# Patient Record
Sex: FEMALE | Race: BLACK OR AFRICAN AMERICAN | NOT HISPANIC OR LATINO | URBAN - METROPOLITAN AREA
[De-identification: names, ages, dates, MRNs, and addresses within clinical notes are randomized per-mention and may not be internally consistent; named-entity substitution may affect disease eponyms.]

---

## 2017-04-11 ENCOUNTER — OUTPATIENT (OUTPATIENT)
Dept: OUTPATIENT SERVICES | Facility: HOSPITAL | Age: 37
LOS: 1 days | Discharge: HOME | End: 2017-04-11

## 2017-06-27 DIAGNOSIS — R76.11 NONSPECIFIC REACTION TO TUBERCULIN SKIN TEST WITHOUT ACTIVE TUBERCULOSIS: ICD-10-CM

## 2017-07-04 ENCOUNTER — EMERGENCY (EMERGENCY)
Facility: HOSPITAL | Age: 37
LOS: 0 days | Discharge: HOME | End: 2017-07-04
Admitting: SPECIALIST

## 2017-07-04 DIAGNOSIS — M79.641 PAIN IN RIGHT HAND: ICD-10-CM

## 2017-07-26 ENCOUNTER — OUTPATIENT (OUTPATIENT)
Dept: OUTPATIENT SERVICES | Facility: HOSPITAL | Age: 37
LOS: 1 days | Discharge: HOME | End: 2017-07-26

## 2017-07-26 DIAGNOSIS — M54.2 CERVICALGIA: ICD-10-CM

## 2018-01-15 ENCOUNTER — OUTPATIENT (OUTPATIENT)
Dept: OUTPATIENT SERVICES | Facility: HOSPITAL | Age: 38
LOS: 1 days | Discharge: HOME | End: 2018-01-15

## 2018-01-15 DIAGNOSIS — R05 COUGH: ICD-10-CM

## 2018-06-26 ENCOUNTER — OUTPATIENT (OUTPATIENT)
Dept: OUTPATIENT SERVICES | Facility: HOSPITAL | Age: 38
LOS: 1 days | Discharge: HOME | End: 2018-06-26

## 2018-06-26 DIAGNOSIS — F18.10 INHALANT ABUSE, UNCOMPLICATED: ICD-10-CM

## 2018-12-16 ENCOUNTER — EMERGENCY (EMERGENCY)
Facility: HOSPITAL | Age: 38
LOS: 0 days | Discharge: HOME | End: 2018-12-16
Attending: STUDENT IN AN ORGANIZED HEALTH CARE EDUCATION/TRAINING PROGRAM | Admitting: STUDENT IN AN ORGANIZED HEALTH CARE EDUCATION/TRAINING PROGRAM

## 2018-12-16 VITALS
HEART RATE: 72 BPM | RESPIRATION RATE: 18 BRPM | SYSTOLIC BLOOD PRESSURE: 135 MMHG | DIASTOLIC BLOOD PRESSURE: 72 MMHG | TEMPERATURE: 99 F | OXYGEN SATURATION: 99 %

## 2018-12-16 VITALS
HEART RATE: 60 BPM | TEMPERATURE: 98 F | DIASTOLIC BLOOD PRESSURE: 97 MMHG | SYSTOLIC BLOOD PRESSURE: 172 MMHG | WEIGHT: 181.66 LBS | OXYGEN SATURATION: 98 % | RESPIRATION RATE: 20 BRPM

## 2018-12-16 DIAGNOSIS — Z98.891 HISTORY OF UTERINE SCAR FROM PREVIOUS SURGERY: Chronic | ICD-10-CM

## 2018-12-16 DIAGNOSIS — R10.9 UNSPECIFIED ABDOMINAL PAIN: ICD-10-CM

## 2018-12-16 DIAGNOSIS — R51 HEADACHE: ICD-10-CM

## 2018-12-16 DIAGNOSIS — R10.84 GENERALIZED ABDOMINAL PAIN: ICD-10-CM

## 2018-12-16 DIAGNOSIS — Z98.890 OTHER SPECIFIED POSTPROCEDURAL STATES: ICD-10-CM

## 2018-12-16 LAB
ALBUMIN SERPL ELPH-MCNC: 4 G/DL — SIGNIFICANT CHANGE UP (ref 3.5–5.2)
ALP SERPL-CCNC: 76 U/L — SIGNIFICANT CHANGE UP (ref 30–115)
ALT FLD-CCNC: 14 U/L — SIGNIFICANT CHANGE UP (ref 0–41)
ANION GAP SERPL CALC-SCNC: 10 MMOL/L — SIGNIFICANT CHANGE UP (ref 7–14)
APPEARANCE UR: CLEAR — SIGNIFICANT CHANGE UP
APTT BLD: 40.9 SEC — HIGH (ref 27–39.2)
AST SERPL-CCNC: 19 U/L — SIGNIFICANT CHANGE UP (ref 0–41)
BACTERIA # UR AUTO: ABNORMAL /HPF
BASE EXCESS BLDV CALC-SCNC: 3.5 MMOL/L — HIGH (ref -2–2)
BASOPHILS # BLD AUTO: 0.04 K/UL — SIGNIFICANT CHANGE UP (ref 0–0.2)
BASOPHILS NFR BLD AUTO: 0.7 % — SIGNIFICANT CHANGE UP (ref 0–1)
BILIRUB DIRECT SERPL-MCNC: <0.2 MG/DL — SIGNIFICANT CHANGE UP (ref 0–0.2)
BILIRUB INDIRECT FLD-MCNC: >0.2 MG/DL — SIGNIFICANT CHANGE UP (ref 0.2–1.2)
BILIRUB SERPL-MCNC: 0.4 MG/DL — SIGNIFICANT CHANGE UP (ref 0.2–1.2)
BILIRUB UR-MCNC: NEGATIVE — SIGNIFICANT CHANGE UP
BUN SERPL-MCNC: 10 MG/DL — SIGNIFICANT CHANGE UP (ref 10–20)
CA-I SERPL-SCNC: 1.28 MMOL/L — SIGNIFICANT CHANGE UP (ref 1.12–1.3)
CALCIUM SERPL-MCNC: 9.7 MG/DL — SIGNIFICANT CHANGE UP (ref 8.5–10.1)
CHLORIDE SERPL-SCNC: 104 MMOL/L — SIGNIFICANT CHANGE UP (ref 98–110)
CO2 SERPL-SCNC: 27 MMOL/L — SIGNIFICANT CHANGE UP (ref 17–32)
COLOR SPEC: YELLOW — SIGNIFICANT CHANGE UP
CREAT SERPL-MCNC: 0.8 MG/DL — SIGNIFICANT CHANGE UP (ref 0.7–1.5)
DIFF PNL FLD: ABNORMAL
EOSINOPHIL # BLD AUTO: 0.14 K/UL — SIGNIFICANT CHANGE UP (ref 0–0.7)
EOSINOPHIL NFR BLD AUTO: 2.5 % — SIGNIFICANT CHANGE UP (ref 0–8)
EPI CELLS # UR: ABNORMAL /HPF
GAS PNL BLDV: 143 MMOL/L — SIGNIFICANT CHANGE UP (ref 136–145)
GAS PNL BLDV: SIGNIFICANT CHANGE UP
GLUCOSE SERPL-MCNC: 75 MG/DL — SIGNIFICANT CHANGE UP (ref 70–99)
GLUCOSE UR QL: NEGATIVE MG/DL — SIGNIFICANT CHANGE UP
HCO3 BLDV-SCNC: 30 MMOL/L — HIGH (ref 22–29)
HCT VFR BLD CALC: 35.5 % — LOW (ref 37–47)
HCT VFR BLDA CALC: 38.8 % — SIGNIFICANT CHANGE UP (ref 34–44)
HGB BLD CALC-MCNC: 12.7 G/DL — LOW (ref 14–18)
HGB BLD-MCNC: 11.8 G/DL — LOW (ref 12–16)
IMM GRANULOCYTES NFR BLD AUTO: 0.2 % — SIGNIFICANT CHANGE UP (ref 0.1–0.3)
INR BLD: 1.13 RATIO — SIGNIFICANT CHANGE UP (ref 0.65–1.3)
KETONES UR-MCNC: NEGATIVE — SIGNIFICANT CHANGE UP
LACTATE BLDV-MCNC: 0.5 MMOL/L — SIGNIFICANT CHANGE UP (ref 0.5–1.6)
LEUKOCYTE ESTERASE UR-ACNC: NEGATIVE — SIGNIFICANT CHANGE UP
LIDOCAIN IGE QN: 34 U/L — SIGNIFICANT CHANGE UP (ref 7–60)
LYMPHOCYTES # BLD AUTO: 2.54 K/UL — SIGNIFICANT CHANGE UP (ref 1.2–3.4)
LYMPHOCYTES # BLD AUTO: 44.9 % — SIGNIFICANT CHANGE UP (ref 20.5–51.1)
MAGNESIUM SERPL-MCNC: 2 MG/DL — SIGNIFICANT CHANGE UP (ref 1.8–2.4)
MCHC RBC-ENTMCNC: 28.2 PG — SIGNIFICANT CHANGE UP (ref 27–31)
MCHC RBC-ENTMCNC: 33.2 G/DL — SIGNIFICANT CHANGE UP (ref 32–37)
MCV RBC AUTO: 84.7 FL — SIGNIFICANT CHANGE UP (ref 81–99)
MONOCYTES # BLD AUTO: 0.51 K/UL — SIGNIFICANT CHANGE UP (ref 0.1–0.6)
MONOCYTES NFR BLD AUTO: 9 % — SIGNIFICANT CHANGE UP (ref 1.7–9.3)
NEUTROPHILS # BLD AUTO: 2.42 K/UL — SIGNIFICANT CHANGE UP (ref 1.4–6.5)
NEUTROPHILS NFR BLD AUTO: 42.7 % — SIGNIFICANT CHANGE UP (ref 42.2–75.2)
NITRITE UR-MCNC: NEGATIVE — SIGNIFICANT CHANGE UP
NRBC # BLD: 0 /100 WBCS — SIGNIFICANT CHANGE UP (ref 0–0)
PCO2 BLDV: 53 MMHG — HIGH (ref 41–51)
PH BLDV: 7.36 — SIGNIFICANT CHANGE UP (ref 7.26–7.43)
PH UR: 6 — SIGNIFICANT CHANGE UP (ref 5–8)
PHOSPHATE SERPL-MCNC: 3.7 MG/DL — SIGNIFICANT CHANGE UP (ref 2.1–4.9)
PLATELET # BLD AUTO: 225 K/UL — SIGNIFICANT CHANGE UP (ref 130–400)
PO2 BLDV: 27 MMHG — SIGNIFICANT CHANGE UP (ref 20–40)
POTASSIUM BLDV-SCNC: 3.7 MMOL/L — SIGNIFICANT CHANGE UP (ref 3.3–5.6)
POTASSIUM SERPL-MCNC: 4.2 MMOL/L — SIGNIFICANT CHANGE UP (ref 3.5–5)
POTASSIUM SERPL-SCNC: 4.2 MMOL/L — SIGNIFICANT CHANGE UP (ref 3.5–5)
PROT SERPL-MCNC: 7.1 G/DL — SIGNIFICANT CHANGE UP (ref 6–8)
PROT UR-MCNC: NEGATIVE MG/DL — SIGNIFICANT CHANGE UP
PROTHROM AB SERPL-ACNC: 13 SEC — HIGH (ref 9.95–12.87)
RBC # BLD: 4.19 M/UL — LOW (ref 4.2–5.4)
RBC # FLD: 12.9 % — SIGNIFICANT CHANGE UP (ref 11.5–14.5)
RBC CASTS # UR COMP ASSIST: SIGNIFICANT CHANGE UP /HPF
SAO2 % BLDV: 44 % — SIGNIFICANT CHANGE UP
SODIUM SERPL-SCNC: 141 MMOL/L — SIGNIFICANT CHANGE UP (ref 135–146)
SP GR SPEC: 1.01 — SIGNIFICANT CHANGE UP (ref 1.01–1.03)
TROPONIN T SERPL-MCNC: <0.01 NG/ML — SIGNIFICANT CHANGE UP
UROBILINOGEN FLD QL: 0.2 MG/DL — SIGNIFICANT CHANGE UP (ref 0.2–0.2)
WBC # BLD: 5.66 K/UL — SIGNIFICANT CHANGE UP (ref 4.8–10.8)
WBC # FLD AUTO: 5.66 K/UL — SIGNIFICANT CHANGE UP (ref 4.8–10.8)

## 2018-12-16 RX ORDER — FAMOTIDINE 10 MG/ML
20 INJECTION INTRAVENOUS ONCE
Qty: 0 | Refills: 0 | Status: COMPLETED | OUTPATIENT
Start: 2018-12-16 | End: 2018-12-16

## 2018-12-16 RX ORDER — ALUMINUM HYDROXIDE AND MAGNESIUM CARBONATE 160; 105 MG/1; MG/1
10 TABLET, CHEWABLE ORAL
Qty: 250 | Refills: 0
Start: 2018-12-16

## 2018-12-16 RX ORDER — MORPHINE SULFATE 50 MG/1
2 CAPSULE, EXTENDED RELEASE ORAL ONCE
Qty: 0 | Refills: 0 | Status: DISCONTINUED | OUTPATIENT
Start: 2018-12-16 | End: 2018-12-16

## 2018-12-16 RX ORDER — SODIUM CHLORIDE 9 MG/ML
1000 INJECTION INTRAMUSCULAR; INTRAVENOUS; SUBCUTANEOUS ONCE
Qty: 0 | Refills: 0 | Status: COMPLETED | OUTPATIENT
Start: 2018-12-16 | End: 2018-12-16

## 2018-12-16 RX ORDER — FAMOTIDINE 10 MG/ML
1 INJECTION INTRAVENOUS
Qty: 14 | Refills: 0
Start: 2018-12-16

## 2018-12-16 RX ADMIN — SODIUM CHLORIDE 2000 MILLILITER(S): 9 INJECTION INTRAMUSCULAR; INTRAVENOUS; SUBCUTANEOUS at 12:58

## 2018-12-16 RX ADMIN — MORPHINE SULFATE 2 MILLIGRAM(S): 50 CAPSULE, EXTENDED RELEASE ORAL at 12:58

## 2018-12-16 RX ADMIN — FAMOTIDINE 20 MILLIGRAM(S): 10 INJECTION INTRAVENOUS at 12:58

## 2018-12-16 RX ADMIN — SODIUM CHLORIDE 1000 MILLILITER(S): 9 INJECTION INTRAMUSCULAR; INTRAVENOUS; SUBCUTANEOUS at 14:48

## 2018-12-16 NOTE — ED PROVIDER NOTE - CARE PLAN
Assessment and plan of treatment:	AP- r/o colitis/appy/diverticulitis/pancreatitis/hb disease/uti/pyelo will get belly labcs, ctap, morphine for pain  ha- most likely tension, no focal neuro deficits, not sudden onset/c/f SAH, no meningismus, no onc complaints Principal Discharge DX:	Abdominal pain, unspecified abdominal location  Assessment and plan of treatment:	AP- r/o colitis/appy/diverticulitis/pancreatitis/hb disease/uti/pyelo will get belly labcs, ctap, morphine for pain  ha- most likely tension, no focal neuro deficits, not sudden onset/c/f SAH, no meningismus, no onc complaints

## 2018-12-16 NOTE — ED PROVIDER NOTE - NS ED ROS FT
Constitutional: no fever or rigors  Eyes: no eye redness, acute visual change  ENMT: no ear pain, no throat pain  Card: no chest pain, no palpitations  Pulm: no cough, no shortness of breath  GI: pos abdominal pain, no nausea or vomiting  : no dysuria or hematuria  MSK: no limitation in range of motion, no neck pain  Skin: no rash, no abrasion  Neuro: no numbness, no weakness, pos ha  Heme/Onc: no easy bruising, no bleeding tendency   Allergic: no hives, no throat swelling

## 2018-12-16 NOTE — ED PROVIDER NOTE - PLAN OF CARE
AP- r/o colitis/appy/diverticulitis/pancreatitis/hb disease/uti/pyelo will get belly labcs, ctap, morphine for pain  ha- most likely tension, no focal neuro deficits, not sudden onset/c/f SAH, no meningismus, no onc complaints

## 2018-12-16 NOTE — ED PROVIDER NOTE - NSFOLLOWUPINSTRUCTIONS_ED_ALL_ED_FT
Take pepcid daily. Take gaviscon as needed for upper abdominal pain and bentyl for lower abdominal pain. Follow up with your primary doctor within 2 weeks for reassessment. Follow up with Dr. Stinson within 4 weeks if pain persists.     Abdominal Pain    Many things can cause abdominal pain. Many times, abdominal pain is not caused by a disease and will improve without treatment. Your health care provider will do a physical exam to determine if there is a dangerous cause of your pain; blood tests and imaging may help determine the cause of your pain. However, in many cases, no cause may be found and you may need further testing as an outpatient. Monitor your abdominal pain for any changes.     SEEK IMMEDIATE MEDICAL CARE IF YOU HAVE ANY OF THE FOLLOWING SYMPTOMS: worsening abdominal pain, uncontrollable vomiting, profuse diarrhea, inability to have bowel movements or pass gas, black or bloody stools, fever accompanying chest pain or back pain, or fainting. These symptoms may represent a serious problem that is an emergency. Do not wait to see if the symptoms will go away. Get medical help right away. Call 911 and do not drive yourself to the hospital.

## 2018-12-16 NOTE — ED PROVIDER NOTE - MEDICAL DECISION MAKING DETAILS
feeling much better, no acute findings. stable for outpt f/u w/ gi  Patient to be discharged from ED. Any available test results were discussed with patient and/or family. Verbal instructions given, including instructions to return to ED immediately for any new, worsening, or concerning symptoms. Patient endorsed understanding. Written discharge instructions additionally given, including follow-up plan.

## 2018-12-16 NOTE — ED PROVIDER NOTE - OBJECTIVE STATEMENT
37 yo f no pmh, psh distant c/s here for ap. pt endorsing 1 week diffuse ap. more in periumbilical region. pt also w/ epigastric pain after eating. no n/v/d. no dysuria, no vaginal bleeding or d/c. no cp, sob, syncope. mild decrease in appetite.  pt also endorsing intermittent ha. pt states she gets ha from time to time. ha occipital, intermittent. sometimes wallace improves w/o intervention but will consistently improve w/ nsaid. no sudden onset/acute ha. no fever/neck stiffness/photophobia/trauma.

## 2018-12-16 NOTE — ED ADULT NURSE NOTE - OBJECTIVE STATEMENT
pt presents to ER complaining of b/l lower abd pain x 1 week. pt denies N/V/D. pt denies urinary symptoms. pt complains of generalized weakness. no prior treatment.

## 2018-12-16 NOTE — ED PROVIDER NOTE - PHYSICAL EXAMINATION
PHYSICAL EXAM:    Constitutional: awake, alert, NAD  Eyes: EOMI, no conj injection  HENT: NC AT  Back: no c/t/l spine ttp  Respiratory: no respiratory distress, breath sounds equal b/l, no wheezing, rhonchi or stridor.   Cardiovascular: RRR nml S1S2  Gastrointestinal: soft, mild diffuse tenderness, more so periumbilical, llq/rlq, nondistended. No guarding or rebound. + LCVAT  Extremities: no peripheral edema  Neurological: AAOx3, CN II-XII grossly intact, no focal numbness or weakness, normal speech and coordination  Skin: no rash  Musculoskeletal: no gross deformity

## 2019-01-15 PROBLEM — Z00.00 ENCOUNTER FOR PREVENTIVE HEALTH EXAMINATION: Status: ACTIVE | Noted: 2019-01-15

## 2019-02-04 ENCOUNTER — APPOINTMENT (OUTPATIENT)
Dept: OBGYN | Facility: CLINIC | Age: 39
End: 2019-02-04
Payer: MEDICAID

## 2019-02-04 PROCEDURE — 99396 PREV VISIT EST AGE 40-64: CPT

## 2019-02-06 ENCOUNTER — APPOINTMENT (OUTPATIENT)
Dept: OBGYN | Facility: CLINIC | Age: 39
End: 2019-02-06
Payer: MEDICAID

## 2019-02-06 PROCEDURE — 76830 TRANSVAGINAL US NON-OB: CPT

## 2019-02-11 ENCOUNTER — OUTPATIENT (OUTPATIENT)
Dept: OUTPATIENT SERVICES | Facility: HOSPITAL | Age: 39
LOS: 1 days | Discharge: HOME | End: 2019-02-11

## 2019-02-11 DIAGNOSIS — Z12.31 ENCOUNTER FOR SCREENING MAMMOGRAM FOR MALIGNANT NEOPLASM OF BREAST: ICD-10-CM

## 2019-02-11 DIAGNOSIS — Z98.891 HISTORY OF UTERINE SCAR FROM PREVIOUS SURGERY: Chronic | ICD-10-CM

## 2019-02-25 ENCOUNTER — OUTPATIENT (OUTPATIENT)
Dept: OUTPATIENT SERVICES | Facility: HOSPITAL | Age: 39
LOS: 1 days | Discharge: HOME | End: 2019-02-25

## 2019-02-25 DIAGNOSIS — Z98.891 HISTORY OF UTERINE SCAR FROM PREVIOUS SURGERY: Chronic | ICD-10-CM

## 2019-02-25 DIAGNOSIS — R92.8 OTHER ABNORMAL AND INCONCLUSIVE FINDINGS ON DIAGNOSTIC IMAGING OF BREAST: ICD-10-CM

## 2019-02-26 ENCOUNTER — APPOINTMENT (OUTPATIENT)
Dept: OBGYN | Facility: CLINIC | Age: 39
End: 2019-02-26
Payer: MEDICAID

## 2019-02-26 PROCEDURE — 99212 OFFICE O/P EST SF 10 MIN: CPT

## 2019-03-20 ENCOUNTER — OUTPATIENT (OUTPATIENT)
Dept: OUTPATIENT SERVICES | Facility: HOSPITAL | Age: 39
LOS: 1 days | Discharge: HOME | End: 2019-03-20

## 2019-03-20 DIAGNOSIS — M54.5 LOW BACK PAIN: ICD-10-CM

## 2019-03-20 DIAGNOSIS — Z98.891 HISTORY OF UTERINE SCAR FROM PREVIOUS SURGERY: Chronic | ICD-10-CM

## 2020-02-24 ENCOUNTER — OUTPATIENT (OUTPATIENT)
Dept: OUTPATIENT SERVICES | Facility: HOSPITAL | Age: 40
LOS: 1 days | Discharge: HOME | End: 2020-02-24

## 2020-02-24 ENCOUNTER — TRANSCRIPTION ENCOUNTER (OUTPATIENT)
Age: 40
End: 2020-02-24

## 2020-02-24 VITALS — RESPIRATION RATE: 18 BRPM | HEART RATE: 61 BPM | DIASTOLIC BLOOD PRESSURE: 84 MMHG | SYSTOLIC BLOOD PRESSURE: 168 MMHG

## 2020-02-24 VITALS
HEIGHT: 63 IN | RESPIRATION RATE: 18 BRPM | DIASTOLIC BLOOD PRESSURE: 78 MMHG | SYSTOLIC BLOOD PRESSURE: 150 MMHG | HEART RATE: 72 BPM | TEMPERATURE: 98 F | WEIGHT: 197.98 LBS

## 2020-02-24 DIAGNOSIS — Z98.891 HISTORY OF UTERINE SCAR FROM PREVIOUS SURGERY: Chronic | ICD-10-CM

## 2020-02-24 DIAGNOSIS — D64.9 ANEMIA, UNSPECIFIED: ICD-10-CM

## 2020-02-24 NOTE — ASU DISCHARGE PLAN (ADULT/PEDIATRIC) - CALL YOUR DOCTOR IF YOU HAVE ANY OF THE FOLLOWING:
Excessive diarrhea/Bleeding that does not stop/Pain not relieved by Medications/Nausea and vomiting that does not stop/Inability to tolerate liquids or foods/Increased irritability or sluggishness

## 2020-03-02 DIAGNOSIS — K64.8 OTHER HEMORRHOIDS: ICD-10-CM

## 2020-03-02 DIAGNOSIS — K92.1 MELENA: ICD-10-CM

## 2020-06-15 ENCOUNTER — APPOINTMENT (OUTPATIENT)
Dept: OBGYN | Facility: CLINIC | Age: 40
End: 2020-06-15
Payer: MEDICAID

## 2020-06-15 PROCEDURE — 99396 PREV VISIT EST AGE 40-64: CPT

## 2020-06-15 PROCEDURE — 87490 CHLMYD TRACH DNA DIR PROBE: CPT

## 2020-06-22 ENCOUNTER — APPOINTMENT (OUTPATIENT)
Dept: OBGYN | Facility: CLINIC | Age: 40
End: 2020-06-22
Payer: MEDICAID

## 2020-06-22 PROCEDURE — 76830 TRANSVAGINAL US NON-OB: CPT

## 2021-04-06 ENCOUNTER — APPOINTMENT (OUTPATIENT)
Dept: OBGYN | Facility: CLINIC | Age: 41
End: 2021-04-06
Payer: MEDICAID

## 2021-04-06 PROCEDURE — 99213 OFFICE O/P EST LOW 20 MIN: CPT

## 2021-04-06 PROCEDURE — 99072 ADDL SUPL MATRL&STAF TM PHE: CPT

## 2021-04-20 ENCOUNTER — APPOINTMENT (OUTPATIENT)
Dept: OBGYN | Facility: CLINIC | Age: 41
End: 2021-04-20
Payer: MEDICAID

## 2021-04-20 PROCEDURE — 99213 OFFICE O/P EST LOW 20 MIN: CPT

## 2021-04-20 PROCEDURE — 99072 ADDL SUPL MATRL&STAF TM PHE: CPT

## 2021-05-25 ENCOUNTER — APPOINTMENT (OUTPATIENT)
Dept: OBGYN | Facility: CLINIC | Age: 41
End: 2021-05-25

## 2021-07-06 ENCOUNTER — APPOINTMENT (OUTPATIENT)
Dept: OBGYN | Facility: CLINIC | Age: 41
End: 2021-07-06
Payer: MEDICAID

## 2021-07-06 ENCOUNTER — NON-APPOINTMENT (OUTPATIENT)
Age: 41
End: 2021-07-06

## 2021-07-06 PROCEDURE — 87490 CHLMYD TRACH DNA DIR PROBE: CPT

## 2021-07-06 PROCEDURE — 99396 PREV VISIT EST AGE 40-64: CPT

## 2021-07-15 ENCOUNTER — RESULT REVIEW (OUTPATIENT)
Age: 41
End: 2021-07-15

## 2021-07-15 ENCOUNTER — OUTPATIENT (OUTPATIENT)
Dept: OUTPATIENT SERVICES | Facility: HOSPITAL | Age: 41
LOS: 1 days | Discharge: HOME | End: 2021-07-15
Payer: MEDICAID

## 2021-07-15 DIAGNOSIS — Z12.31 ENCOUNTER FOR SCREENING MAMMOGRAM FOR MALIGNANT NEOPLASM OF BREAST: ICD-10-CM

## 2021-07-15 DIAGNOSIS — Z98.891 HISTORY OF UTERINE SCAR FROM PREVIOUS SURGERY: Chronic | ICD-10-CM

## 2021-07-15 PROCEDURE — 77067 SCR MAMMO BI INCL CAD: CPT | Mod: 26

## 2021-07-15 PROCEDURE — 77063 BREAST TOMOSYNTHESIS BI: CPT | Mod: 26

## 2022-09-20 ENCOUNTER — APPOINTMENT (OUTPATIENT)
Dept: OBGYN | Facility: CLINIC | Age: 42
End: 2022-09-20
Payer: SELF-PAY

## 2022-09-20 VITALS
WEIGHT: 203 LBS | BODY MASS INDEX: 35.97 KG/M2 | SYSTOLIC BLOOD PRESSURE: 120 MMHG | DIASTOLIC BLOOD PRESSURE: 70 MMHG | HEIGHT: 63 IN

## 2022-09-20 PROCEDURE — 99396 PREV VISIT EST AGE 40-64: CPT

## 2022-09-20 NOTE — HISTORY OF PRESENT ILLNESS
[FreeTextEntry1] : ----43 Y/O  PARA 3  HERE FOR CHECK UP;MENOPAUSAL.\par PMHX;/HX OF FIBROID UTERUS\par PSHX;C/S X1,BTL, ENDOMETRIAL ABLATION\par SOCIAL;-ETOH -CIGG      \par STD;      /        DISCUSSED CONDOMS\par FAMILY HX OF BREAST CANCER;NO\par REVIEW OF SYMPTOMS DONE\par ALLERGIES;  Patient has answered NKDA\par Medication reconciliation was completed by reviewing, with the patient's\par involvement, the patient's current outpatient medications and those \par ordered for the patient today. \par \par PE;BREASTS;- MASSES DC NODES SBE\par ABDOMEN SOFT NT ND\par NL GENITALIA\par VAGINA -DC\par CX-CMT\par UTERUS TOP NL SIZE NON TENDER\par ADNEXA NT - MASSES\par \par A;P;CHECK UP, \par -PAP GC CHLAMYDIA\par -OPAL\par -F-U PRN.

## 2022-10-04 ENCOUNTER — RESULT REVIEW (OUTPATIENT)
Age: 42
End: 2022-10-04

## 2022-10-04 ENCOUNTER — OUTPATIENT (OUTPATIENT)
Dept: OUTPATIENT SERVICES | Facility: HOSPITAL | Age: 42
LOS: 1 days | Discharge: HOME | End: 2022-10-04
Payer: SELF-PAY

## 2022-10-04 DIAGNOSIS — Z98.891 HISTORY OF UTERINE SCAR FROM PREVIOUS SURGERY: Chronic | ICD-10-CM

## 2022-10-04 DIAGNOSIS — Z12.31 ENCOUNTER FOR SCREENING MAMMOGRAM FOR MALIGNANT NEOPLASM OF BREAST: ICD-10-CM

## 2022-10-04 PROCEDURE — 77063 BREAST TOMOSYNTHESIS BI: CPT | Mod: 26

## 2022-10-04 PROCEDURE — 77067 SCR MAMMO BI INCL CAD: CPT | Mod: 26

## 2022-11-29 ENCOUNTER — APPOINTMENT (OUTPATIENT)
Dept: OBGYN | Facility: CLINIC | Age: 42
End: 2022-11-29
Payer: SELF-PAY

## 2022-11-29 DIAGNOSIS — D21.9 BENIGN NEOPLASM OF CONNECTIVE AND OTHER SOFT TISSUE, UNSPECIFIED: ICD-10-CM

## 2022-11-29 PROCEDURE — 76830 TRANSVAGINAL US NON-OB: CPT

## 2022-11-29 PROCEDURE — 99213 OFFICE O/P EST LOW 20 MIN: CPT

## 2022-11-29 NOTE — HISTORY OF PRESENT ILLNESS
[FreeTextEntry1] : ----43 Y/O  PARA 3  HERE FOR F-U;SONO REVIEWED;FIBROID UTERUS.\par PMHX;/HX OF FIBROID UTERUS\par PSHX;C/S X1,BTL, ENDOMETRIAL ABLATION\par SOCIAL;-ETOH -CIGG      \par STD;      /        DISCUSSED CONDOMS\par FAMILY HX OF BREAST CANCER;NO\par REVIEW OF SYMPTOMS DONE\par ALLERGIES;  Patient has answered NKDA\par Medication reconciliation was completed by reviewing, with the patient's\par involvement, the patient's current outpatient medications and those \par ordered for the patient today. \par \par PE;\par ABDOMEN SOFT NT ND\par EXT -CCE\par \par A;P;FIBROID UTERUS\par -SONO REVIEWED\par -F-U PRN.\par

## 2024-03-28 ENCOUNTER — EMERGENCY (EMERGENCY)
Facility: HOSPITAL | Age: 44
LOS: 0 days | Discharge: HOME | End: 2024-03-28
Attending: EMERGENCY MEDICINE
Payer: COMMERCIAL

## 2024-03-28 VITALS
TEMPERATURE: 98 F | SYSTOLIC BLOOD PRESSURE: 137 MMHG | OXYGEN SATURATION: 99 % | DIASTOLIC BLOOD PRESSURE: 82 MMHG | RESPIRATION RATE: 19 BRPM | HEART RATE: 73 BPM

## 2024-03-28 DIAGNOSIS — R11.2 NAUSEA WITH VOMITING, UNSPECIFIED: ICD-10-CM

## 2024-03-28 DIAGNOSIS — M79.10 MYALGIA, UNSPECIFIED SITE: ICD-10-CM

## 2024-03-28 DIAGNOSIS — R53.1 WEAKNESS: ICD-10-CM

## 2024-03-28 DIAGNOSIS — Z98.891 HISTORY OF UTERINE SCAR FROM PREVIOUS SURGERY: Chronic | ICD-10-CM

## 2024-03-28 DIAGNOSIS — Z20.822 CONTACT WITH AND (SUSPECTED) EXPOSURE TO COVID-19: ICD-10-CM

## 2024-03-28 DIAGNOSIS — R50.9 FEVER, UNSPECIFIED: ICD-10-CM

## 2024-03-28 DIAGNOSIS — R19.7 DIARRHEA, UNSPECIFIED: ICD-10-CM

## 2024-03-28 LAB
ALBUMIN SERPL ELPH-MCNC: 3.6 G/DL — SIGNIFICANT CHANGE UP (ref 3.5–5.2)
ALP SERPL-CCNC: 85 U/L — SIGNIFICANT CHANGE UP (ref 30–115)
ALT FLD-CCNC: 14 U/L — SIGNIFICANT CHANGE UP (ref 0–41)
ANION GAP SERPL CALC-SCNC: 9 MMOL/L — SIGNIFICANT CHANGE UP (ref 7–14)
APPEARANCE UR: CLEAR — SIGNIFICANT CHANGE UP
AST SERPL-CCNC: 17 U/L — SIGNIFICANT CHANGE UP (ref 0–41)
BASOPHILS # BLD AUTO: 0.04 K/UL — SIGNIFICANT CHANGE UP (ref 0–0.2)
BASOPHILS NFR BLD AUTO: 0.5 % — SIGNIFICANT CHANGE UP (ref 0–1)
BILIRUB SERPL-MCNC: 0.4 MG/DL — SIGNIFICANT CHANGE UP (ref 0.2–1.2)
BILIRUB UR-MCNC: NEGATIVE — SIGNIFICANT CHANGE UP
BUN SERPL-MCNC: 6 MG/DL — LOW (ref 10–20)
CALCIUM SERPL-MCNC: 9 MG/DL — SIGNIFICANT CHANGE UP (ref 8.4–10.4)
CHLORIDE SERPL-SCNC: 107 MMOL/L — SIGNIFICANT CHANGE UP (ref 98–110)
CO2 SERPL-SCNC: 28 MMOL/L — SIGNIFICANT CHANGE UP (ref 17–32)
COLOR SPEC: YELLOW — SIGNIFICANT CHANGE UP
CREAT SERPL-MCNC: 0.8 MG/DL — SIGNIFICANT CHANGE UP (ref 0.7–1.5)
DIFF PNL FLD: NEGATIVE — SIGNIFICANT CHANGE UP
EGFR: 92 ML/MIN/1.73M2 — SIGNIFICANT CHANGE UP
EOSINOPHIL # BLD AUTO: 0.19 K/UL — SIGNIFICANT CHANGE UP (ref 0–0.7)
EOSINOPHIL NFR BLD AUTO: 2.3 % — SIGNIFICANT CHANGE UP (ref 0–8)
FLUAV AG NPH QL: SIGNIFICANT CHANGE UP
FLUBV AG NPH QL: SIGNIFICANT CHANGE UP
GLUCOSE SERPL-MCNC: 122 MG/DL — HIGH (ref 70–99)
GLUCOSE UR QL: NEGATIVE MG/DL — SIGNIFICANT CHANGE UP
HCT VFR BLD CALC: 31.5 % — LOW (ref 37–47)
HGB BLD-MCNC: 9.9 G/DL — LOW (ref 12–16)
IMM GRANULOCYTES NFR BLD AUTO: 0.4 % — HIGH (ref 0.1–0.3)
KETONES UR-MCNC: NEGATIVE MG/DL — SIGNIFICANT CHANGE UP
LEUKOCYTE ESTERASE UR-ACNC: NEGATIVE — SIGNIFICANT CHANGE UP
LYMPHOCYTES # BLD AUTO: 3.68 K/UL — HIGH (ref 1.2–3.4)
LYMPHOCYTES # BLD AUTO: 44.1 % — SIGNIFICANT CHANGE UP (ref 20.5–51.1)
MAGNESIUM SERPL-MCNC: 2.1 MG/DL — SIGNIFICANT CHANGE UP (ref 1.8–2.4)
MCHC RBC-ENTMCNC: 26.9 PG — LOW (ref 27–31)
MCHC RBC-ENTMCNC: 31.4 G/DL — LOW (ref 32–37)
MCV RBC AUTO: 85.6 FL — SIGNIFICANT CHANGE UP (ref 81–99)
MONOCYTES # BLD AUTO: 0.66 K/UL — HIGH (ref 0.1–0.6)
MONOCYTES NFR BLD AUTO: 7.9 % — SIGNIFICANT CHANGE UP (ref 1.7–9.3)
NEUTROPHILS # BLD AUTO: 3.74 K/UL — SIGNIFICANT CHANGE UP (ref 1.4–6.5)
NEUTROPHILS NFR BLD AUTO: 44.8 % — SIGNIFICANT CHANGE UP (ref 42.2–75.2)
NITRITE UR-MCNC: NEGATIVE — SIGNIFICANT CHANGE UP
NRBC # BLD: 0 /100 WBCS — SIGNIFICANT CHANGE UP (ref 0–0)
PH UR: 6.5 — SIGNIFICANT CHANGE UP (ref 5–8)
PLATELET # BLD AUTO: 282 K/UL — SIGNIFICANT CHANGE UP (ref 130–400)
PMV BLD: 11.1 FL — HIGH (ref 7.4–10.4)
POTASSIUM SERPL-MCNC: 3.8 MMOL/L — SIGNIFICANT CHANGE UP (ref 3.5–5)
POTASSIUM SERPL-SCNC: 3.8 MMOL/L — SIGNIFICANT CHANGE UP (ref 3.5–5)
PROT SERPL-MCNC: 7.1 G/DL — SIGNIFICANT CHANGE UP (ref 6–8)
PROT UR-MCNC: SIGNIFICANT CHANGE UP MG/DL
RBC # BLD: 3.68 M/UL — LOW (ref 4.2–5.4)
RBC # FLD: 14.3 % — SIGNIFICANT CHANGE UP (ref 11.5–14.5)
RSV RNA NPH QL NAA+NON-PROBE: SIGNIFICANT CHANGE UP
SARS-COV-2 RNA SPEC QL NAA+PROBE: SIGNIFICANT CHANGE UP
SODIUM SERPL-SCNC: 144 MMOL/L — SIGNIFICANT CHANGE UP (ref 135–146)
SP GR SPEC: 1.02 — SIGNIFICANT CHANGE UP (ref 1–1.03)
UROBILINOGEN FLD QL: 1 MG/DL — SIGNIFICANT CHANGE UP (ref 0.2–1)
WBC # BLD: 8.34 K/UL — SIGNIFICANT CHANGE UP (ref 4.8–10.8)
WBC # FLD AUTO: 8.34 K/UL — SIGNIFICANT CHANGE UP (ref 4.8–10.8)

## 2024-03-28 PROCEDURE — 81003 URINALYSIS AUTO W/O SCOPE: CPT

## 2024-03-28 PROCEDURE — 36415 COLL VENOUS BLD VENIPUNCTURE: CPT

## 2024-03-28 PROCEDURE — 80053 COMPREHEN METABOLIC PANEL: CPT

## 2024-03-28 PROCEDURE — 93010 ELECTROCARDIOGRAM REPORT: CPT

## 2024-03-28 PROCEDURE — 83735 ASSAY OF MAGNESIUM: CPT

## 2024-03-28 PROCEDURE — 85025 COMPLETE CBC W/AUTO DIFF WBC: CPT

## 2024-03-28 PROCEDURE — 99284 EMERGENCY DEPT VISIT MOD MDM: CPT

## 2024-03-28 PROCEDURE — 93005 ELECTROCARDIOGRAM TRACING: CPT

## 2024-03-28 PROCEDURE — 0241U: CPT

## 2024-03-28 PROCEDURE — 96374 THER/PROPH/DIAG INJ IV PUSH: CPT

## 2024-03-28 PROCEDURE — 99284 EMERGENCY DEPT VISIT MOD MDM: CPT | Mod: 25

## 2024-03-28 RX ORDER — ONDANSETRON 8 MG/1
4 TABLET, FILM COATED ORAL ONCE
Refills: 0 | Status: COMPLETED | OUTPATIENT
Start: 2024-03-28 | End: 2024-03-28

## 2024-03-28 RX ORDER — SODIUM CHLORIDE 9 MG/ML
1000 INJECTION INTRAMUSCULAR; INTRAVENOUS; SUBCUTANEOUS ONCE
Refills: 0 | Status: COMPLETED | OUTPATIENT
Start: 2024-03-28 | End: 2024-03-28

## 2024-03-28 RX ORDER — IBUPROFEN 200 MG
600 TABLET ORAL ONCE
Refills: 0 | Status: DISCONTINUED | OUTPATIENT
Start: 2024-03-28 | End: 2024-03-28

## 2024-03-28 RX ADMIN — SODIUM CHLORIDE 1000 MILLILITER(S): 9 INJECTION INTRAMUSCULAR; INTRAVENOUS; SUBCUTANEOUS at 16:57

## 2024-03-28 RX ADMIN — ONDANSETRON 4 MILLIGRAM(S): 8 TABLET, FILM COATED ORAL at 17:32

## 2024-03-28 NOTE — ED PROVIDER NOTE - CLINICAL SUMMARY MEDICAL DECISION MAKING FREE TEXT BOX
44-year-old female with myalgias/flulike illness for the past 2 days.  Patient has nontoxic appearance, vital signs were reviewed and are normal.  Exam is reassuring.  Labs were ordered and reviewed, she was given fluids, antiemetics, reported feeling better, tolerating p.o.  EKG was independently interpreted by me, ED attending Dr Rebecca Pratt, as negative for acute ischemic changes.  Results of all tests were given to the patient, she was advised to follow-up with her PCP (patient has a PCP, does not remember their name at present).  Anticipatory guidance provided, strict return precautions given.  Patient was understanding is amenable with the DC plan.

## 2024-03-28 NOTE — ED PROVIDER NOTE - ATTENDING APP SHARED VISIT CONTRIBUTION OF CARE
44-year-old female without any significant past medical history presenting for evaluation of flulike illness for the past several days.  Patient reports generalized weakness, myalgias, arthralgias, nausea with several episodes of NBNB emesis and a few episodes of nonbloody diarrhea.  Denies any fever, shortness of breath, cough, runny nose, skin rash, abdominal pain, urinary complaints, abnormal vaginal discharge or any additional issues.  No known sick contacts.  Of note, patient returned from [facility] about 2 weeks ago, spent 2 weeks in the city, no history of insect bites, her immunizations for travel to [place] are up-to-date. Patient denies any tobacco, drug or alcohol use.  Reports history of , last menstrual period several years ago.  Well-appearing well-nourished, NAD, head AT/NC, PERRL, pink conjunctivae,  somewhat tacky oral mucosa, nml phonation without drooling or trismus, supple neck without meningeal signs, nml work of breathing, lungs CTA b/l, equal air entry, RRR, well-perfused extremities, distal pulses intact, abdomen soft, NT/ND, BS present in all quadrants, no midline spine or CVA ttp, no leg edema or unilateral calf swelling, A&Ox3, no focal neuro deficits, nml mood and affect.  Plan: Hydration, antiemetics, labs, swab, reassess/PO challenge.  Patient is amenable to plan.

## 2024-03-28 NOTE — ED PROVIDER NOTE - NSFOLLOWUPINSTRUCTIONS_ED_ALL_ED_FT
Muscle pain (myalgia) may be mild or severe. In most cases, the pain lasts only a short time and it goes away without treatment. It is normal to feel some muscle pain after starting a workout program. Muscles that have not been used often will be sore at first.  Muscle pain may also be caused by many other things, including:  Overuse or muscle strain, especially if you are not in shape. This is the most common cause of muscle pain.Injury.Bruises.Viruses, such as the flu.Infectious diseases.A chronic condition that causes muscle tenderness, fatigue, and headache (fibromyalgia).A condition, such as lupus, in which the body’s disease-fighting system attacks other organs in the body (autoimmune or rheumatologic diseases).Certain drugs, including ACE inhibitors and statins.To diagnose the cause of your muscle pain, your health care provider will do a physical exam and ask questions about the pain and when it began. If you have not had muscle pain for very long, your health care provider may want to wait before doing much testing. If your muscle pain has lasted a long time, your health care provider may want to run tests right away. In some cases, this may include tests to rule out certain conditions or illnesses.  Treatment for muscle pain depends on the cause. Home care is often enough to relieve muscle pain. Your health care provider may also prescribe anti-inflammatory medicine.  Follow these instructions at home:  Activity     If overuse is causing your muscle pain:  Slow down your activities until the pain goes away.Do regular, gentle exercises if you are not usually active.Warm up before exercising. Stretch before and after exercising. This can help lower the risk of muscle pain.Do not continue working out if the pain is very bad. Bad pain could mean that you have injured a muscle.Managing pain and discomfort     If directed, apply ice to the sore muscle:  Put ice in a plastic bag.Place a towel between your skin and the bag.Leave the ice on for 20 minutes, 2–3 times a day.You may also alternate between applying ice and applying heat as told by your health care provider. To apply heat, use the heat source that your health care provider recommends, such as a moist heat pack or a heating pad.  Place a towel between your skin and the heat source.Leave the heat on for 20–30 minutes.Remove the heat if your skin turns bright red. This is especially important if you are unable to feel pain, heat, or cold. You may have a greater risk of getting burned.Medicines     Take over-the-counter and prescription medicines only as told by your health care provider.Do not drive or use heavy machinery while taking prescription pain medicine.Contact a health care provider if:  Your muscle pain gets worse and medicines do not help.You have muscle pain that lasts longer than 3 days.You have a rash or fever along with muscle pain.You have muscle pain after a tick bite.You have muscle pain while working out, even though you are in good physical condition.You have redness, soreness, or swelling along with muscle pain.You have muscle pain after starting a new medicine or changing the dose of a medicine.Get help right away if:  You have trouble breathing.You have trouble swallowing.You have muscle pain along with a stiff neck, fever, and vomiting.You have severe muscle weakness or cannot move part of your body.This information is not intended to replace advice given to you by your health care provider. Make sure you discuss any questions you have with your health care provider.

## 2024-03-28 NOTE — ED PROVIDER NOTE - PATIENT PORTAL LINK FT
You can access the FollowMyHealth Patient Portal offered by [facility] by registering at the following website: [email or web address]. By joining [facility]’s FollowMyHealth portal, you will also be able to view your health information using other applications (apps) compatible with our system.

## 2024-03-28 NOTE — ED PROVIDER NOTE - OBJECTIVE STATEMENT
44-year-old female complaining of feeling feverish, chills, weak, body aches, backache, nausea, vomiting and diarrhea x 3 days.  Patient states that she came back from [place] 3 weeks ago. LMP over 1 year ago.

## 2024-03-28 NOTE — ED PROVIDER NOTE - PHYSICAL EXAMINATION
Gen: Alert, NAD, well appearing  Head: NC, AT, PERRL, EOMI, normal lids/conjunctiva  ENT: normal hearing, dry mucous membranes  Neck: +supple, no tenderness/meningismus,  Pulm: Bilateral BS, normal resp effort, no wheeze/stridor/retractions  CV: RRR  Abd: soft, NT/ND  Mskel: no edema/erythema/cyanosis  Skin: no rash, warm/dry  Neuro: AAOx3, no sensory/motor deficits

## 2024-07-02 ENCOUNTER — APPOINTMENT (OUTPATIENT)
Dept: OBGYN | Facility: CLINIC | Age: 44
End: 2024-07-02
Payer: COMMERCIAL

## 2024-07-02 DIAGNOSIS — Z01.419 ENCOUNTER FOR GYNECOLOGICAL EXAMINATION (GENERAL) (ROUTINE) W/OUT ABNORMAL FINDINGS: ICD-10-CM

## 2024-07-02 PROCEDURE — 99396 PREV VISIT EST AGE 40-64: CPT

## 2024-07-05 LAB
C TRACH RRNA SPEC QL NAA+PROBE: NOT DETECTED
HPV HIGH+LOW RISK DNA PNL CVX: NOT DETECTED
N GONORRHOEA RRNA SPEC QL NAA+PROBE: NOT DETECTED
SOURCE TP AMPLIFICATION: NORMAL

## 2024-07-08 LAB — CYTOLOGY CVX/VAG DOC THIN PREP: NORMAL

## 2024-07-18 ENCOUNTER — RESULT REVIEW (OUTPATIENT)
Age: 44
End: 2024-07-18

## 2024-07-18 ENCOUNTER — OUTPATIENT (OUTPATIENT)
Dept: OUTPATIENT SERVICES | Facility: HOSPITAL | Age: 44
LOS: 1 days | End: 2024-07-18
Payer: COMMERCIAL

## 2024-07-18 DIAGNOSIS — Z12.31 ENCOUNTER FOR SCREENING MAMMOGRAM FOR MALIGNANT NEOPLASM OF BREAST: ICD-10-CM

## 2024-07-18 DIAGNOSIS — Z98.891 HISTORY OF UTERINE SCAR FROM PREVIOUS SURGERY: Chronic | ICD-10-CM

## 2024-07-18 PROCEDURE — 77063 BREAST TOMOSYNTHESIS BI: CPT | Mod: 26

## 2024-07-18 PROCEDURE — 77063 BREAST TOMOSYNTHESIS BI: CPT

## 2024-07-18 PROCEDURE — 77067 SCR MAMMO BI INCL CAD: CPT | Mod: 26

## 2024-07-18 PROCEDURE — 77067 SCR MAMMO BI INCL CAD: CPT

## 2024-07-19 DIAGNOSIS — Z12.31 ENCOUNTER FOR SCREENING MAMMOGRAM FOR MALIGNANT NEOPLASM OF BREAST: ICD-10-CM

## 2025-05-24 NOTE — H&P PST ADULT - HISTORY OF PRESENT ILLNESS
[] : Resident 40 year female patient is here for colonoscopy [Time Spent: ___ minutes] : I have spent [unfilled] minutes of time on the encounter which excludes teaching and separately reported services.